# Patient Record
Sex: FEMALE | Race: WHITE | NOT HISPANIC OR LATINO | URBAN - METROPOLITAN AREA
[De-identification: names, ages, dates, MRNs, and addresses within clinical notes are randomized per-mention and may not be internally consistent; named-entity substitution may affect disease eponyms.]

---

## 2023-03-04 LAB
CLUE CELLS: ABNORMAL
NUGENT SCORE: 4
VAGINITIS-BV + YEAST INTERPRETATION: ABNORMAL
YEAST: ABNORMAL

## 2024-02-14 NOTE — PROGRESS NOTES
Naye Recinosargenisstacey is a 53 y.o. G 2 (2002)    No chief complaint on file.       Last Gyn Visit: 12/14/2021 with me  Last pap: 12/10/2019 unremarkable with cotesting  Mammogram: 9/25/2023 unremarkable at [facility]  Colonoscopy: 1/2019 11/2020 postmenopausal without bleeding  Coincided with the start of chemotherapy  Personal history of breast cancer      Assessment and Plan:

## 2024-02-20 ENCOUNTER — APPOINTMENT (OUTPATIENT)
Dept: OBSTETRICS AND GYNECOLOGY | Facility: CLINIC | Age: 54
End: 2024-02-20
Payer: COMMERCIAL

## 2024-02-27 ENCOUNTER — OFFICE VISIT (OUTPATIENT)
Dept: OBSTETRICS AND GYNECOLOGY | Facility: CLINIC | Age: 54
End: 2024-02-27
Payer: COMMERCIAL

## 2024-02-27 VITALS
BODY MASS INDEX: 29.71 KG/M2 | WEIGHT: 174 LBS | SYSTOLIC BLOOD PRESSURE: 118 MMHG | DIASTOLIC BLOOD PRESSURE: 78 MMHG | HEIGHT: 64 IN

## 2024-02-27 DIAGNOSIS — L73.1 INGROWN HAIR: Primary | ICD-10-CM

## 2024-02-27 DIAGNOSIS — Z01.419 WELL WOMAN EXAM WITH ROUTINE GYNECOLOGICAL EXAM: ICD-10-CM

## 2024-02-27 DIAGNOSIS — Z12.4 CERVICAL CANCER SCREENING: ICD-10-CM

## 2024-02-27 PROCEDURE — 88142 CYTOPATH C/V THIN LAYER: CPT

## 2024-02-27 PROCEDURE — 1036F TOBACCO NON-USER: CPT

## 2024-02-27 PROCEDURE — 87624 HPV HI-RISK TYP POOLED RSLT: CPT

## 2024-02-27 PROCEDURE — 99396 PREV VISIT EST AGE 40-64: CPT

## 2024-02-27 RX ORDER — ASPIRIN 325 MG
50000 TABLET, DELAYED RELEASE (ENTERIC COATED) ORAL WEEKLY
COMMUNITY
Start: 2021-11-23

## 2024-02-27 RX ORDER — FERROUS SULFATE, DRIED 160(50) MG
1 TABLET, EXTENDED RELEASE ORAL
COMMUNITY
Start: 2023-02-07

## 2024-02-27 RX ORDER — MUPIROCIN 20 MG/G
OINTMENT TOPICAL
Qty: 22 G | Refills: 2 | Status: SHIPPED | OUTPATIENT
Start: 2024-02-27 | End: 2024-03-08

## 2024-02-27 RX ORDER — LANOLIN ALCOHOL/MO/W.PET/CERES
2000 CREAM (GRAM) TOPICAL
COMMUNITY
Start: 2023-07-20

## 2024-02-27 ASSESSMENT — PAIN SCALES - GENERAL: PAINLEVEL: 0-NO PAIN

## 2024-02-27 NOTE — PROGRESS NOTES
"Assessment/Plan   Diagnoses and all orders for this visit:  Ingrown hair  -     mupirocin (Bactroban) 2 % ointment; Apply topically 3 times a day for 10 days.  Cervical cancer screening  -     THINPREP PAP TEST  Well woman exam with routine gynecological exam   Dicussed lubrication options for vaginal atrophy when avoiding estrogen products     Bette Palomino, APRN-CNM     Subjective   Naye Cisse is a 53 y.o. female who is here for a routine exam.     Concerns today:  Vaginal dryness - pt has a history of breast ca .   Worried about a infected hair follicle on pubic area - says she gets them often and warm compresses have not been effective.     Patient's last menstrual period was 2020.   Pt has not had a period since chemo age 49     Sexual Activity: sexually active, male partners; .  Pain with intercourse? Yes   Loss of desire? Yes   Able to have an orgasm? Yes     History of prior STI: none    Current contraception:  n/a     Last pap: 2019 - nilm hpv neg   History of abnormal Pap smear: no  Family history of uterine or ovarian cancer: no    Last mammogram: up to date   History of abnormal mammogram: yes - pt has a history of breast ca   Family history of breast cancer: no  Menstrual History:  OB History          2    Para   2    Term   2            AB        Living   2         SAB        IAB        Ectopic        Multiple        Live Births   2               Patient's last menstrual period was 2020.       Objective   /78   Ht 1.626 m (5' 4\")   Wt 78.9 kg (174 lb)   LMP 2020   BMI 29.87 kg/m²   Physical Exam  Constitutional:       General: She is not in acute distress.     Appearance: Normal appearance. She is normal weight. She is not ill-appearing.   Genitourinary:      Vulva, rectum and urethral meatus normal.      No lesions in the vagina.      Right Labia: No rash, lesions, skin changes or Bartholin's cyst.     Left Labia: No lesions, skin changes, Bartholin's cyst or " rash.     No labial fusion noted.      No vaginal discharge, erythema or tenderness.      Mild vaginal atrophy present.       Right Adnexa: not tender, not full and no mass present.     Left Adnexa: not tender, not full and no mass present.     No cervical discharge, friability, lesion, polyp or nabothian cyst.      Uterus is not enlarged, fixed, tender or prolapsed.      No uterine mass detected.     Pelvic exam was performed with patient in the lithotomy position.   Breasts:     Breasts are soft.     Right: Normal.      Left: Normal.   HENT:      Head: Normocephalic.      Nose: Nose normal. No congestion.      Mouth/Throat:      Mouth: Mucous membranes are moist.      Pharynx: Oropharynx is clear. No oropharyngeal exudate or posterior oropharyngeal erythema.   Eyes:      General:         Right eye: No discharge.         Left eye: No discharge.      Extraocular Movements: Extraocular movements intact.      Conjunctiva/sclera: Conjunctivae normal.      Pupils: Pupils are equal, round, and reactive to light.   Neck:      Thyroid: No thyroid mass, thyromegaly or thyroid tenderness.   Cardiovascular:      Rate and Rhythm: Normal rate and regular rhythm.      Heart sounds: Normal heart sounds.   Pulmonary:      Effort: Pulmonary effort is normal.      Breath sounds: Normal breath sounds and air entry.   Abdominal:      General: Abdomen is flat. There is no distension.      Palpations: Abdomen is soft.   Musculoskeletal:         General: No swelling, deformity or signs of injury. Normal range of motion.      Cervical back: Full passive range of motion without pain and normal range of motion. No edema, erythema or tenderness.   Neurological:      General: No focal deficit present.      Mental Status: She is alert and oriented to person, place, and time. Mental status is at baseline.      Sensory: No sensory deficit.      Motor: No weakness.      Gait: Gait normal.   Skin:     General: Skin is warm and dry.      Findings:  No bruising, erythema, lesion or rash.      Comments: Small infected follicle on pubic area  - surrounding area non erythematous.     Psychiatric:         Mood and Affect: Mood normal.         Behavior: Behavior normal.         Thought Content: Thought content normal.         Judgment: Judgment normal.         4 = No assist / stand by assistance

## 2024-03-12 ENCOUNTER — HOSPITAL ENCOUNTER (OUTPATIENT)
Dept: RADIOLOGY | Facility: CLINIC | Age: 54
Discharge: HOME | End: 2024-03-12
Payer: COMMERCIAL

## 2024-03-12 ENCOUNTER — OFFICE VISIT (OUTPATIENT)
Dept: ORTHOPEDIC SURGERY | Facility: CLINIC | Age: 54
End: 2024-03-12
Payer: COMMERCIAL

## 2024-03-12 DIAGNOSIS — M79.604 PAIN OF RIGHT LOWER EXTREMITY: ICD-10-CM

## 2024-03-12 DIAGNOSIS — S76.311A HAMSTRING STRAIN, RIGHT, INITIAL ENCOUNTER: ICD-10-CM

## 2024-03-12 PROCEDURE — 1036F TOBACCO NON-USER: CPT | Performed by: FAMILY MEDICINE

## 2024-03-12 PROCEDURE — 73502 X-RAY EXAM HIP UNI 2-3 VIEWS: CPT | Mod: RT

## 2024-03-12 PROCEDURE — 99214 OFFICE O/P EST MOD 30 MIN: CPT | Mod: 25 | Performed by: FAMILY MEDICINE

## 2024-03-12 PROCEDURE — 99204 OFFICE O/P NEW MOD 45 MIN: CPT | Performed by: FAMILY MEDICINE

## 2024-03-12 PROCEDURE — 73502 X-RAY EXAM HIP UNI 2-3 VIEWS: CPT | Mod: RIGHT SIDE | Performed by: FAMILY MEDICINE

## 2024-03-12 RX ORDER — NAPROXEN 500 MG/1
500 TABLET ORAL
Qty: 28 TABLET | Refills: 0 | Status: SHIPPED | OUTPATIENT
Start: 2024-03-12 | End: 2024-03-26

## 2024-03-12 RX ORDER — CYCLOBENZAPRINE HCL 10 MG
10 TABLET ORAL NIGHTLY PRN
Qty: 14 TABLET | Refills: 0 | Status: SHIPPED | OUTPATIENT
Start: 2024-03-12 | End: 2024-03-26

## 2024-03-12 NOTE — LETTER
March 12, 2024     Patient: Naye Cisse   YOB: 1970   Date of Visit: 3/12/2024       To Whom It May Concern:    It is my medical opinion that Naye Cisse  may return to work with no lifting, pushing, or pulling more than 5 pounds for 4 weeks .    If you have any questions or concerns, please don't hesitate to call.         Sincerely,        Cole C Budinsky, MD

## 2024-03-12 NOTE — PROGRESS NOTES
Acute Injury New Patient Visit    CC:   Chief Complaint   Patient presents with    Right Leg - Pain     Rt leg pain  [employer]WC  Fell over rack 3/11/24         HPI: Naye is a 53 y.o.female who presents today with new complaints of BWC injury, pain discomfort to the posterior aspect of the right leg and buttock.  She states that she had fallen forward over a rack.  She currently works at the [employer].  She was moving a rack of clothing when it had fallen forward she fell forward as well and in the process injured the back of her right leg and buttock.  She presents here today for further evaluation.  She denies any numbness tingling or burning states significant pain discomfort and has not had any issue problems with the hamstring or leg in the past.        Review of Systems   GENERAL: Negative for malaise, significant weight loss, fever  MUSCULOSKELETAL: See HPI  NEURO: Negative for numbness / tingling     Past Medical History  Past Medical History:   Diagnosis Date    Encounter for screening mammogram for malignant neoplasm of breast 07/10/2013    Visit for screening mammogram    Epigastric pain 09/03/2013    Dyspepsia    Hirsutism 08/05/2013    Hirsutism    Other conditions influencing health status 08/26/2013    Blepharitis In The Left Eye    Personal history of other diseases of the respiratory system 08/05/2013    History of allergic rhinitis    Personal history of other specified conditions 06/23/2020    History of abnormal mammogram    Plantar fascial fibromatosis 08/05/2013    Plantar fasciitis    Thyrotoxicosis, unspecified without thyrotoxic crisis or storm 08/05/2013    Hyperthyroidism       Medication review  Medication Documentation Review Audit       Reviewed by Suzie Ordoñez MA (Medical Assistant) on 02/27/24 at 1357      Medication Order Taking? Sig Documenting Provider Last Dose Status   calcium carbonate-vitamin D3 500 mg-5 mcg (200 unit) tablet [id number] Yes Take 1 tablet by mouth once daily.  Historical Provider, MD  Active   cholecalciferol (Vitamin D-3) 50,000 unit capsule [id number] Yes Take 1 capsule (50,000 Units) by mouth once a week. Historical Provider, MD  Active   cyanocobalamin (Vitamin B-12) 1,000 mcg tablet [id number] Yes Take 2 tablets (2,000 mcg) by mouth once daily. Historical Provider, MD  Active                    Allergies  Allergies   Allergen Reactions    Penicillins Hives, Itching, Nausea Only, Shortness of breath, Swelling and Unknown    Hydrocodone-Acetaminophen Hallucinations    Minocycline Diarrhea, Dizziness, Nausea Only, Hives and Unknown       Social History  Social History     Socioeconomic History    Marital status:      Spouse name: Not on file    Number of children: Not on file    Years of education: Not on file    Highest education level: Not on file   Occupational History    Not on file   Tobacco Use    Smoking status: Never    Smokeless tobacco: Never   Vaping Use    Vaping Use: Never used   Substance and Sexual Activity    Alcohol use: Not Currently    Drug use: Never    Sexual activity: Yes     Partners: Male   Other Topics Concern    Not on file   Social History Narrative    Not on file     Social Determinants of Health     Financial Resource Strain: Not on file   Food Insecurity: Not on file   Transportation Needs: Not on file   Physical Activity: Not on file   Stress: Not on file   Social Connections: Not on file   Intimate Partner Violence: Not on file   Housing Stability: Not on file       Surgical History  Past Surgical History:   Procedure Laterality Date    BREAST BIOPSY  3 years ago    BREAST LUMPECTOMY  3 years ago    BREAST SURGERY  2013    Breast Surgery Reduction Procedure Bilateral     SECTION, LOW TRANSVERSE  09- 10-    OTHER SURGICAL HISTORY  2013    Contraceptives    SPINE SURGERY  2013    Spine Repair    UMBILICAL HERNIA REPAIR  2013    Umbilical Hernia Repair       Physical Exam:  GENERAL:  Patient  is awake, alert, and oriented to person place and time.  Patient appears well nourished and well kept.  Affect Calm, Not Acutely Distressed.  HEENT:  Normocephalic, Atraumatic, EOMI  CARDIOVASCULAR:  Hemodynamically stable.  RESPIRATORY:  Normal respirations with unlabored breathing.  NEURO: Gross sensation intact to the lower extremities bilaterally.  Extremity: Right lower extremity exam demonstrates skin which is warm pink well-perfused she has global soft tissue tenderness around the piriformis bursa and at the insertion of the hamstring.  There is no obvious palpable defect or deformity.  Patient is extremely uncomfortable with light and firm palpation over the affected region.  The hamstring is quite sore.  No distal hamstring pain medially or laterally.  Mid substance of the hamstring is unaffected.  The quad tendon and quad are nontender.  Tight hamstrings noted with limited ability to flex and extend at the knee.  Normal internal/external rotation about the hip.      Diagnostics: X-rays today were negative for avulsion injury        Procedure: None  Procedures    Assessment:   Problem List Items Addressed This Visit    None  Visit Diagnoses       Pain of right lower extremity        Relevant Orders    XR hip right with pelvis when performed 2 or 3 views    Hamstring strain, right, initial encounter        Relevant Medications    naproxen (Naprosyn) 500 mg tablet    cyclobenzaprine (Flexeril) 10 mg tablet    Other Relevant Orders    Referral to Physical Therapy             Plan: At this time we discussed some conservative approach management to this hamstring strain injury.  Will offer her anti-inflammatory and muscle relaxer.  She denies need for steroid.  Also submit C9 prior authorization for physical therapy prescription.  For now she will hold off on crutches or a walker.  She may ambulate and tolerate weightbearing as able.  No need for repeat x-rays next visit we will see her back in 3 to 4 weeks for  repeat evaluation.  He will be provided a light duty work note no lifting climbing pushing pulling more than 5 pounds until seen in follow-up.  Orders Placed This Encounter    XR hip right with pelvis when performed 2 or 3 views    Referral to Physical Therapy    naproxen (Naprosyn) 500 mg tablet    cyclobenzaprine (Flexeril) 10 mg tablet      At the conclusion of the visit there were no further questions by the patient/family regarding their plan of care.  Patient was instructed to call or return with any issues, questions, or concerns regarding their injury and/or treatment plan described above.     03/12/24 at 1:11 PM - Cole C Budinsky, MD    Office: [phone]    This note was prepared using voice recognition software.  The details of this note are correct and have been reviewed, and corrected to the best of my ability.  Some grammatical errors may persist related to the Dragon software.

## 2024-03-14 LAB
CYTOLOGY CMNT CVX/VAG CYTO-IMP: NORMAL
HPV HR 12 DNA GENITAL QL NAA+PROBE: NEGATIVE
HPV HR GENOTYPES PNL CVX NAA+PROBE: NEGATIVE
HPV16 DNA SPEC QL NAA+PROBE: NEGATIVE
HPV18 DNA SPEC QL NAA+PROBE: NEGATIVE
LAB AP HPV GENOTYPE QUESTION: YES
LAB AP HPV HR: NORMAL
LABORATORY COMMENT REPORT: NORMAL
LABORATORY COMMENT REPORT: NORMAL
PATH REPORT.TOTAL CANCER: NORMAL

## 2024-03-26 ENCOUNTER — EVALUATION (OUTPATIENT)
Dept: PHYSICAL THERAPY | Facility: CLINIC | Age: 54
End: 2024-03-26
Payer: COMMERCIAL

## 2024-03-26 DIAGNOSIS — S76.311A HAMSTRING STRAIN, RIGHT, INITIAL ENCOUNTER: ICD-10-CM

## 2024-03-26 PROCEDURE — 97161 PT EVAL LOW COMPLEX 20 MIN: CPT | Mod: GP

## 2024-03-26 PROCEDURE — 97110 THERAPEUTIC EXERCISES: CPT | Mod: GP

## 2024-03-26 NOTE — PROGRESS NOTES
"Patient Name: Naye Cisse  MRN: [id number]  Today's Date: 3/26/2024  Visit #1  Time Calculation  Start Time: 708  Stop Time: 747  Time Calculation (min): 39 min    Subjective:  Chief Complaint: Naye is a 53 y.o. F who presents to therapy c/o R buttock and posterior thigh pain. Symptoms began 3/11 after falling forward while moving a cart at work. Notes that she felt a \"pop\" and was unable to walk. She was able to ice and notes that it improved slightly that night. . Today she still feels her pain - especially when walking and bending forward. She notes that her pain is most consistent in her R hamstring but occasionally radiates to her R buttock. Denies any N/T, changes in gait, unexplained weight loss, bowel/bladder changes, saddle.anesthesia.   Pain intensity at rest: 0/10  Pain intensity at worst: 7/10  Alleviating factors: Rest, pain medication, ice  Aggravating factors: Bending, stairs, walking  Occupation: [employer] - [employer]  Imaging: R hip films \"unremarkable\"  Therapy Goals: Decrease pain    Objective:  Lumbar ROM  Flexion: 75% loss 2/2 hamstring pain - improved w/ bent knee  Extension: No loss  Side bend (L/R): No loss/25% loss 2/2 hamstring pain  Rotation (L/R): No loss/No loss    Flexion: No change  Extension: No change  Side bend (L/R): No change, No change    Hip ROM (L/R)  Flexion: 125°/125°: Knee ext: 90/60 HS pain  Abduction: 45°/45°  Extension: 10°/10°  External Rotation: 45°/45°  Internal rotation: 45°/45°    Specific Lower Extremity MMT (L/R)  Iliopsoas: 4/5, 4/5  Gluteus Antony (prone): 4/5, 4-/5 (painful)  Hip External Rotation: 4+/5, 4+/5  Gluteus Medius: 4/5, 4/5  Hamstrin+/5, 3+/5 (painful)    Special Tests  Scour: Negative  FADDIR: Negative   Hamstring Length: Unable to test on R 2/2 pain    Palpation: TTP R: Ischial tuberosity, proximal HS (8cm from insertion)      Treatment:  PT Evaluation Time Entry  PT Evaluation (Low) Time Entry: 15  PT Therapeutic Procedures Time Entry  Therapeutic " Exercise Time Entry: 24    Therapeutic Exercise: HEP provided: Completed in session  *Supine heel slides  *Supine HS ISO  *Supine clamshell  *Standing flexion    Education: Recovery of HS strain, expectations, HS anatomy, avoiding pushing through pain, gentle ROM work, purpose of isometrics.     Assessment:   Naye presents to therapy c/o R proximal HS pain after falling over a cart at work. Symptoms appear consistent w/ possible HS strain. She is likely to benefit from skilled interventions to address their impairments, and treatment that includes: manual techniques to decrease pain and improve joint/soft-tissue mobility, as well as exercises to increase strength, endurance, and neuromotor control.    Standardized testing and measures administered today reveal that the patient has multiple impairments in body structures and functions, activity limitations, and participation restrictions.  The patient has 0 personal factors and comorbidities that may serve as barriers affecting the plan of care. The patient's clinical presentation includes stable & uncomplicated characteristics as noted during today's evaluation, & these findings indicate that this patient is of low complexity. Skilled PT services are warranted in order to realize measurable change in the above outcome measures and achieve improvements in the patient's functional status and individual goals.    Plan:   Planned interventions include: dry needling, education/instruction, manual therapy, neuromuscular re-education, self care/home management, therapeutic activities and therapeutic exercises.   Potential to achieve rehab goals: good  Goals:   Demonstrate independence with home exercise program  Tolerate increased exercise without adverse reaction  Demonstrate improved posture & proper body mechanics throughout session  Increase R hip ROM to pain free + WNL  Increase R HS strength to pain free + WNL  Report decrease in pain by > 2 points to meet the  MCID  Increase score of LEFS by > 9 points to meet the MCID  Perform ADLs without an increase symptoms  Ascend / descend stairs without an increase in symptoms  Ambulate >2 miles without an increase in symptoms  Pt. will be able to sleep through the night without an increase in symptoms  Achieve pt. specific goals including: Return to daily exercise routine - uninhibited by pain

## 2024-03-27 ENCOUNTER — APPOINTMENT (OUTPATIENT)
Dept: PHYSICAL THERAPY | Facility: CLINIC | Age: 54
End: 2024-03-27
Payer: COMMERCIAL

## 2024-03-29 ENCOUNTER — TREATMENT (OUTPATIENT)
Dept: PHYSICAL THERAPY | Facility: CLINIC | Age: 54
End: 2024-03-29
Payer: COMMERCIAL

## 2024-03-29 DIAGNOSIS — S76.311A HAMSTRING STRAIN, RIGHT, INITIAL ENCOUNTER: Primary | ICD-10-CM

## 2024-03-29 PROBLEM — S76.311D HAMSTRING STRAIN, RIGHT, SUBSEQUENT ENCOUNTER: Status: ACTIVE | Noted: 2024-03-29

## 2024-03-29 PROCEDURE — 97140 MANUAL THERAPY 1/> REGIONS: CPT | Mod: GP

## 2024-03-29 PROCEDURE — 97110 THERAPEUTIC EXERCISES: CPT | Mod: GP

## 2024-03-29 NOTE — PROGRESS NOTES
Physical Therapy Treatment      Patient Name: Naye Cisse  MRN: [id number]  Today's Date: 3/29/2024  Visit #2       Insurance:  Visit Limit: 12 authorized    Assessment:  Naye presents to therapy w/ improvements in her familiar symptoms. She was able to tolerate progression of HS loading today. Manual well tolerated.    Patient's response to session: Decreased pain and Increased knowledge and understanding    Patient is likely to benefit from skilled interventions to address their impairments, and treatment that includes: manual techniques to decrease pain and improve joint/soft-tissue mobility, as well as exercises to increase strength, endurance, and neuromotor control.     Plan:  Continue per POC.    Current Problem:   1. Hamstring strain, right, initial encounter  Follow Up In Physical Therapy          Subjective   Naye notes that she was able to complete her exercises w/ little to no discomfort. Expresses her frustration w/ slow progression.     Objective       Treatments:       Therapeutic Exercise (37620):  HEP progressed: Handout provided: Completed in full  *Supine HS ISO  *Standing hip ext w/ red TB: 3/3s  *Standing knee flex w/ red TB: 3/3s    Education: Appropriate exercise progressions, avoiding pushing through pain, gentle stretching, answered all questions in full    Manual Therapy (16589):  STM: HS, gluteals, proximal gastroc  Gentle HS pin and stretch  HS stretch contract-relax      Education and discussion on HEP and treatment regarding the benefits related to current condition, POC, pathophysiology, and precautions    *added to HEP

## 2024-04-02 ENCOUNTER — APPOINTMENT (OUTPATIENT)
Dept: ORTHOPEDIC SURGERY | Facility: CLINIC | Age: 54
End: 2024-04-02
Payer: COMMERCIAL

## 2024-04-04 ENCOUNTER — TREATMENT (OUTPATIENT)
Dept: PHYSICAL THERAPY | Facility: CLINIC | Age: 54
End: 2024-04-04
Payer: COMMERCIAL

## 2024-04-04 DIAGNOSIS — S76.311A HAMSTRING STRAIN, RIGHT, INITIAL ENCOUNTER: ICD-10-CM

## 2024-04-04 PROCEDURE — 97140 MANUAL THERAPY 1/> REGIONS: CPT | Mod: GP

## 2024-04-04 PROCEDURE — 97110 THERAPEUTIC EXERCISES: CPT | Mod: GP

## 2024-04-04 NOTE — PROGRESS NOTES
Physical Therapy Treatment      Patient Name: Naye Cisse  MRN: [id number]  Today's Date: 4/4/2024  Visit #3  Time Calculation  Start Time: 1800  Stop Time: 1838  Time Calculation (min): 38 min    Insurance:  Visit Limit: 12 authorized     Assessment:  Naye presents to therapy w/ improvements in her familiar symptoms since last session. She was able to tolerate progression of loading exercises. Will continue to progress as tolerated.     Patient's response to session: Decreased pain and Improved gait    Patient is likely to benefit from skilled interventions to address their impairments, and treatment that includes: manual techniques to decrease pain and improve joint/soft-tissue mobility, as well as exercises to increase strength, endurance, and neuromotor control.     Plan:  Continue per POC.    Current Problem:   1. Hamstring strain, right, initial encounter  Follow Up In Physical Therapy          Subjective   Naye notes that she has been able to walk longer distances and ascend/descend stairs w/ less pain.       Objective       Treatments:  PT Therapeutic Procedures Time Entry  Therapeutic Exercise Time Entry: 23  Therapeutic Massage Time Entry: 15    Therapeutic Exercise (13440):  HEP review  *Supine bridge w/ HS bias  *Standing hip ext w/ blue TB: 3/3s  *Standing knee flex w/ blue TB: 3/3s    Education: Progression of exercises, plyometric work, adjustments to form, pain management, gentle stretching, answered all questions in full    Manual Therapy (28792):  Contract relax: HS stretch  STM: HS, gluteals           Education and discussion on HEP and treatment regarding the benefits related to current condition, POC, pathophysiology, and precautions    *added to HEP

## 2024-04-09 ENCOUNTER — TREATMENT (OUTPATIENT)
Dept: PHYSICAL THERAPY | Facility: CLINIC | Age: 54
End: 2024-04-09
Payer: COMMERCIAL

## 2024-04-09 DIAGNOSIS — S76.311A HAMSTRING STRAIN, RIGHT, INITIAL ENCOUNTER: ICD-10-CM

## 2024-04-09 PROCEDURE — 97110 THERAPEUTIC EXERCISES: CPT | Mod: GP,CQ

## 2024-04-09 NOTE — PROGRESS NOTES
"Patient Name: Naye Cisse  MRN: [id number]  Today's Date: 4/9/2024  Time Calculation  Start Time: 0948  Stop Time: 1030  Time Calculation (min): 42 min  Visit #4    Subjective:  Pt reports she had increased muscle soreness yesterday after completing her HEP. States she has been able to ascend/descend stairs with no pain.    Objective:  Able to independently recall HEP.    Assessment:   Pt tolerated treatment progressions well this date, however prone hip extension caused discomfort. Verbal and tactile cues needed during activities to decrease valgus of R knee. Pt continues to be motivated to return to prior level of function. Pt will benefit from further LE strengthening, stability and mobility activities.    Plan:   Progress as tolerated per the plan of care.     Treatment :   THERAPEUTIC EXERCISE 92856 42 mins/ 3 units  NuStep x5 minutes  HS/GS stretch 2x30  Step ups on 6\" fwd   Blue TB side stepping x2 laps  Cable column walk outs 12.5# x8 laps  SL clamshells x15  Prone extensions   Standing hip flexion 3# x20 each  Bridge with ER BTB x15  SLR 3# x20 each  Tiltboard balance, ant/post, med/lat weight shift 1 minute each              Current Problem:  1. Hamstring strain, right, initial encounter  Follow Up In Physical Therapy              Pain:  5/10   Location: R hamstring     Treatment performed by Mansi AN, under the supervision of Drea Arceo PTA.        "

## 2024-04-12 ENCOUNTER — TREATMENT (OUTPATIENT)
Dept: PHYSICAL THERAPY | Facility: CLINIC | Age: 54
End: 2024-04-12
Payer: COMMERCIAL

## 2024-04-12 DIAGNOSIS — S76.311A HAMSTRING STRAIN, RIGHT, INITIAL ENCOUNTER: ICD-10-CM

## 2024-04-12 PROCEDURE — 97140 MANUAL THERAPY 1/> REGIONS: CPT | Mod: GP,CQ

## 2024-04-12 PROCEDURE — 97110 THERAPEUTIC EXERCISES: CPT | Mod: GP,CQ

## 2024-04-12 NOTE — PROGRESS NOTES
Patient Name: Naye iCsse  MRN: [id number]  Today's Date: 4/12/2024  Time Calculation  Start Time: 1220  Stop Time: 1304  Time Calculation (min): 44 min    Subjective:  Pt reports she is having increased pain in her hamstring. States she did not complete her stretches today which may be contributing to it.  Unable to  something off floor without increased R hamstring pain.    Objective:  + R Hamstring Spasm. Instructed in golfers lift to allow for decreased symptom aggravation with picking up object from floor.     Assessment:   Educated pt in importance of decreasing intensity to end range stretching to allow for proper healing.    Plan:   Cont with POC    Treatment :     NuStep x5 minutes  HS/GS/Hip flexor stretch 2x30  Lunge on step 05x10  Supine nerve glide x15  Bridge with ER GTB x15  Hooklying hip flexion 2# x15  SL hip abduction x15    Manual:  STM Hamstring/Passive stretching           Current Problem:  1. Hamstring strain, right, initial encounter  Follow Up In Physical Therapy         Treatment performed by Mansi AN, under the supervision of Drea Arceo PTA.

## 2024-04-16 ENCOUNTER — TREATMENT (OUTPATIENT)
Dept: PHYSICAL THERAPY | Facility: CLINIC | Age: 54
End: 2024-04-16
Payer: COMMERCIAL

## 2024-04-16 DIAGNOSIS — S76.311A HAMSTRING STRAIN, RIGHT, INITIAL ENCOUNTER: Primary | ICD-10-CM

## 2024-04-16 PROCEDURE — 97140 MANUAL THERAPY 1/> REGIONS: CPT | Mod: GP

## 2024-04-16 PROCEDURE — 97110 THERAPEUTIC EXERCISES: CPT | Mod: GP

## 2024-04-16 NOTE — PROGRESS NOTES
Physical Therapy Treatment      Patient Name: Naye Cisse  MRN: [id number]  Today's Date: 2024  Visit #6  Time Calculation  Start Time: 910  Stop Time: 950  Time Calculation (min): 40 min    Insurance:  Visit Limit: 12 authorized    Assessment:  Naye presents to therapy w/ improvement in her familiar symptoms since last session. Assessment reveals increased R LE strength w/ less symptom irritability. Decreased TTP along R proximal HS (4cm). Progressed HS loading today - which was well tolerated. Mild verbal cueing to prevent knee valgus and improve eccentric control.    Patient's response to session: Decreased pain and Increased knowledge and understanding    Patient is likely to benefit from skilled interventions to address their impairments, and treatment that includes: manual techniques to decrease pain and improve joint/soft-tissue mobility, as well as exercises to increase strength, endurance, and neuromotor control.     Plan:  Continue per POC.    Current Problem:   1. Hamstring strain, right, initial encounter  Follow Up In Physical Therapy          Subjective   Naye notes that she has been able been able to progress her exercise routine w/ little to no discomfort. Notes that her symptoms are less frequent.    Pain = 3/10 today - improving w/ activity    Objective   Specific Lower Extremity MMT (L/R)  Iliopsoas: 4/5, 4/5  Gluteus Antony (prone): 4/5, 4/5 (4/10 familiar pain)  Hip External Rotation: 4+/5, 4+/5  Gluteus Medius: 4/5, 4/5  Hamstrin+/5, 4/5 (4/10 familiar pain)    Palpation: TTP R: proximal HS (4cm from insertion)     Treatments:  PT Therapeutic Procedures Time Entry  Manual Therapy Time Entry: 15  Therapeutic Exercise Time Entry: 30    Therapeutic Exercise (71457):  HEP review  *Supine bridge w/ HS bias  *Standing hip ext w/ blue TB: 3/3s  *Standing knee flex w/ blue TB: 3/3s  *Squat w/ and w/o green TB    Stationary bike: 5' at lvl5 (drop to level 3 d/t symptom irritation)  HS curl:  3 x 12 reps: 30lbs / 15 lbs / 15 lbs  Leg press: 3 x 12 reps: 70 lbs / 50lbs / 50 lbs    Assessment    Education/Discussion: Reviewed safe progressions for home/gym exercises.     Manual Therapy (50602):  Contract relax: HS stretch  STM: HS, gluteals     Education and discussion on HEP and treatment regarding the benefits related to current condition, POC, pathophysiology, and precautions    *added to HEP

## 2024-04-19 ENCOUNTER — APPOINTMENT (OUTPATIENT)
Dept: PHYSICAL THERAPY | Facility: CLINIC | Age: 54
End: 2024-04-19
Payer: COMMERCIAL

## 2024-04-23 ENCOUNTER — TREATMENT (OUTPATIENT)
Dept: PHYSICAL THERAPY | Facility: CLINIC | Age: 54
End: 2024-04-23
Payer: COMMERCIAL

## 2024-04-23 DIAGNOSIS — S76.311A HAMSTRING STRAIN, RIGHT, INITIAL ENCOUNTER: ICD-10-CM

## 2024-04-23 PROCEDURE — 97140 MANUAL THERAPY 1/> REGIONS: CPT | Mod: GP,CQ

## 2024-04-23 PROCEDURE — 97110 THERAPEUTIC EXERCISES: CPT | Mod: GP,CQ

## 2024-04-23 NOTE — PROGRESS NOTES
Patient Name: Naye Cisse  MRN: [id number]  Today's Date: 4/23/2024  Time Calculation  Start Time: 1310  Stop Time: 1340  Time Calculation (min): 30 min    Subjective:  States that overall pain in R hamstring has improved . Symptoms reproduced with walking duration only now. Able to squat without increased pain . States that she feels that she can be done with PT possibly. Follow up with MD  5/1/24.    Objective:  2 /10 mile on track with slight antalgia .    Assessment:   Improved tissue quality noted .    Plan:   Cont with POC     Treatment :   THERAPEUTIC EXERCISE 08395   Gait 2/10 mile on track   Hamstring stretch with Rotation   Hamstring Curl 3x12 reps 30 lbs /15/15   Leg Press 3 x12 70 lbs /50 lbs / 50 lbs   MANUAL TECHNIQUES  79076   IASTM R hamstring   Muscle bending Hamstring   TP Hamstring     Current Problem:  1. Hamstring strain, right, initial encounter  Follow Up In Physical Therapy              Pain:  Denies any pain currently.

## 2024-04-25 ENCOUNTER — TREATMENT (OUTPATIENT)
Dept: PHYSICAL THERAPY | Facility: CLINIC | Age: 54
End: 2024-04-25
Payer: COMMERCIAL

## 2024-04-25 DIAGNOSIS — S76.311A HAMSTRING STRAIN, RIGHT, INITIAL ENCOUNTER: ICD-10-CM

## 2024-04-25 PROCEDURE — 97140 MANUAL THERAPY 1/> REGIONS: CPT | Mod: GP

## 2024-04-25 PROCEDURE — 97110 THERAPEUTIC EXERCISES: CPT | Mod: GP

## 2024-04-25 NOTE — PROGRESS NOTES
Physical Therapy Treatment      Patient Name: Naye Cisse  MRN: [id number]  Today's Date: 4/25/2024  Visit #7  Time Calculation  Start Time: 1630  Stop Time: 1700  Time Calculation (min): 30 min    Insurance:  Visit Limit: 12 authorized     Assessment:  Naye presents to therapy w/ improvements in her familiar pain. She is able to tolerate all therapeutic exercises w/ little to no pain. However - mild pain still present w/ HS loading and palpation. She has a follow up scheduled w/ Dr. Budinsky. I anticipate that she will be able to transition to an independent management program.     Patient's response to session: Decreased pain    Patient is likely to benefit from skilled interventions to address their impairments, and treatment that includes: manual techniques to decrease pain and improve joint/soft-tissue mobility, as well as exercises to increase strength, endurance, and neuromotor control.     Plan:  Continue per POC.    Current Problem:   1. Hamstring strain, right, initial encounter  Follow Up In Physical Therapy          Subjective   Naye notes that her symptoms only seem to reproduced during palpation of her proximal HS. She has been able to transition to all of her usual daily activities/exercises - uninhibited by pain. Needs to leave early to  daughter from school.     Objective       Treatments:  PT Therapeutic Procedures Time Entry  Manual Therapy Time Entry: 10  Therapeutic Exercise Time Entry: 20    Therapeutic Exercise (71075):  HEP review  Stationary bike: 6' @ lvl 5  DL HS curl: 15lbs  SL HS curl: 5lbs (initial pain that improved w/ repetitions)  DL press: 70lbs  SL press: 30lbs    Manual Therapy (92799):  STM: R Proximal HS, gluteals  HS stretch: Contract-relax        Education and discussion on HEP and treatment regarding the benefits related to current condition, POC, pathophysiology, and precautions    *added to HEP

## 2024-04-30 ENCOUNTER — APPOINTMENT (OUTPATIENT)
Dept: PHYSICAL THERAPY | Facility: CLINIC | Age: 54
End: 2024-04-30
Payer: COMMERCIAL

## 2024-05-01 ENCOUNTER — OFFICE VISIT (OUTPATIENT)
Dept: ORTHOPEDIC SURGERY | Facility: CLINIC | Age: 54
End: 2024-05-01
Payer: COMMERCIAL

## 2024-05-01 DIAGNOSIS — S76.311A HAMSTRING STRAIN, RIGHT, INITIAL ENCOUNTER: Primary | ICD-10-CM

## 2024-05-01 PROCEDURE — 99213 OFFICE O/P EST LOW 20 MIN: CPT | Performed by: FAMILY MEDICINE

## 2024-05-01 NOTE — LETTER
May 1, 2024     Patient: Naye Cisse   YOB: 1970   Date of Visit: 5/1/2024       To Whom It May Concern:    It is my medical opinion that Naye Cisse may return to full duty immediately with no restrictions 5/2/24.    If you have any questions or concerns, please don't hesitate to call.         Sincerely,        Cole C Budinsky, MD

## 2024-05-01 NOTE — PROGRESS NOTES
Established Patient Follow-Up Visit    CC:   Chief Complaint   Patient presents with    Right Thigh - Follow-up, Worker's Compensation     Hamstring strain  Re evaluate today DOI: 3/11/24       HPI:  Naye is a 53 y.o. female returns here today for follow-up visit regarding: Return today [facility] injury right hamstring strain.  She states she has been compliant with physical therapy.  She feels nearly 100%.  Intermittently having some mild discomfort deep hide up in the back of the hamstring she has some tightness but is ready to get back to work fully without any restriction.  Patient states she is unable to run or jump and do other activities at home and with therapy without issue          REVIEW OF SYSTEMS:  GENERAL: Negative for malaise, significant weight loss, fever  MUSCULOSKELETAL: See HPI  NEURO: Negative for numbness / tingling       PHYSICAL EXAM:  -Neuro: Gross sensation intact to the lower extremities bilaterally.  -Extremity: Patient with tight hamstrings noted some mild discomfort at the insertion of the hamstring just underneath the ischial tuberosity.  She has a negative logroll full ability to tolerate weightbearing.  No pain at the back of the knee or calf.  Normal gait noted    IMAGING: No new imaging today      PROCEDURE: None  Procedures     ASSESSMENT:   Follow-up visit for:  Problem List Items Addressed This Visit    None  Visit Diagnoses       Hamstring strain, right, initial encounter    -  Primary             PLAN: At this time we will offer the patient as needed follow-up.  She will continue on her last few sessions of physical therapy.  She will be given a full unrestricted return to work note.  Should the persistent worsening issues or concerns she may call or return and if necessary consider submitting prior authorization for further advanced imaging.  For now the patient continue with over-the-counter anti-inflammatories topical muscle rubs creams ice heat etc. as previous and to complete  her PT.  No orders of the defined types were placed in this encounter.          At the conclusion of the visit there were no further questions by the patient/family regarding their plan of care.  Patient was instructed to call or return with any issues, questions, or concerns regarding their injury and/or treatment plan described above.     05/01/24 at 10:49 AM - Cole C Budinsky, MD    Office: [phone]    This note was prepared using voice recognition software.  The details of this note are correct and have been reviewed, and corrected to the best of my ability.  Some grammatical errors may persist related to the Dragon software.

## 2024-05-03 ENCOUNTER — TREATMENT (OUTPATIENT)
Dept: PHYSICAL THERAPY | Facility: CLINIC | Age: 54
End: 2024-05-03
Payer: COMMERCIAL

## 2024-05-03 DIAGNOSIS — S76.311A HAMSTRING STRAIN, RIGHT, INITIAL ENCOUNTER: ICD-10-CM

## 2024-05-03 PROCEDURE — 97110 THERAPEUTIC EXERCISES: CPT | Mod: GP

## 2024-05-03 NOTE — PROGRESS NOTES
Physical Therapy    Discharge Summary    Name: Naye Cisse  MRN: [id number]  : 1970  Date: 24    Discharge Summary: PT    Discharge Information: Date of discharge 5/3/2024, Date of last visit 5/3/2024, Date of evaluation 3/26/2024, and Number of attended visits 8    Therapy Summary: Naye presents to therapy w/ significant improvements in her familiar pain since initial evaluation. She has progressed back to her home/recreational/work activities - uninhibited by pain. Strength/ROM/Muscle length all appear symmetrical and do not reproduce familiar symptoms. She will manage her care independently and feels confident moving forward. Instructed her to call or follow up if she has any questions or concerns.     Objective:    Reassessment:   Lumbar ROM  Flexion: No loss  Extension: No loss  Side bend (L/R): No loss/No loss  Rotation (L/R): No loss/No loss     Hip ROM (L/R)  Flexion: 125°/125°: Knee ext: 90/90  Abduction: 45°/45°  Extension: 10°/10°  External Rotation: 45°/45°  Internal rotation: 45°/45°     Specific Lower Extremity MMT (L/R)  Iliopsoas: 4/5, 4/5  Gluteus Antony (prone): 4/5, 4/5  Hip External Rotation: 4+/5, 4+/5  Gluteus Medius: 4/5, 4/5  Hamstrin+/5, 4+/5      Special Tests  Scour: Negative  FADDIR: Negative   KIM: Negative     Palpation: TTP R: Ischial tuberosity (mild)    LEFS: 80      Treatment:   Therapeutic Exercise:  Reviewed safe gym activities and progressions  Continued HS loading  Self-STM: HS, gluteals (ball provided)    Answered all questions in full    Rehab Discharge Reason: Achieved all and/or the most significant goals(s)  Goals: Met 5/3/2024  Demonstrate independence with home exercise program  Tolerate increased exercise without adverse reaction  Demonstrate improved posture & proper body mechanics throughout session  Increase R hip ROM to pain free + WNL  Increase R HS strength to pain free + WNL  Report decrease in pain by > 2 points to meet the MCID  Increase  score of LEFS by > 9 points to meet the MCID  Perform ADLs without an increase symptoms  Ascend / descend stairs without an increase in symptoms  Ambulate >2 miles without an increase in symptoms  Pt. will be able to sleep through the night without an increase in symptoms  Achieve pt. specific goals including: Return to daily exercise routine - uninhibited by pain

## 2024-05-07 ENCOUNTER — APPOINTMENT (OUTPATIENT)
Dept: PHYSICAL THERAPY | Facility: CLINIC | Age: 54
End: 2024-05-07
Payer: COMMERCIAL

## 2024-10-07 ENCOUNTER — OFFICE VISIT (OUTPATIENT)
Dept: OBSTETRICS AND GYNECOLOGY | Facility: CLINIC | Age: 54
End: 2024-10-07
Payer: COMMERCIAL

## 2024-10-07 ENCOUNTER — TELEPHONE (OUTPATIENT)
Dept: OBSTETRICS AND GYNECOLOGY | Facility: CLINIC | Age: 54
End: 2024-10-07

## 2024-10-07 VITALS
BODY MASS INDEX: 29.71 KG/M2 | DIASTOLIC BLOOD PRESSURE: 64 MMHG | WEIGHT: 174 LBS | SYSTOLIC BLOOD PRESSURE: 118 MMHG | HEIGHT: 64 IN

## 2024-10-07 DIAGNOSIS — N95.0 POST-MENOPAUSAL BLEEDING: Primary | ICD-10-CM

## 2024-10-07 DIAGNOSIS — Z01.89 NORMAL HYSTEROSCOPY: ICD-10-CM

## 2024-10-07 PROCEDURE — 1036F TOBACCO NON-USER: CPT | Performed by: STUDENT IN AN ORGANIZED HEALTH CARE EDUCATION/TRAINING PROGRAM

## 2024-10-07 PROCEDURE — 99213 OFFICE O/P EST LOW 20 MIN: CPT | Performed by: STUDENT IN AN ORGANIZED HEALTH CARE EDUCATION/TRAINING PROGRAM

## 2024-10-07 PROCEDURE — 3008F BODY MASS INDEX DOCD: CPT | Performed by: STUDENT IN AN ORGANIZED HEALTH CARE EDUCATION/TRAINING PROGRAM

## 2024-10-07 RX ORDER — EXEMESTANE 25 MG/1
25 TABLET ORAL DAILY
COMMUNITY
Start: 2024-04-01

## 2024-10-07 RX ORDER — IBUPROFEN 800 MG/1
800 TABLET ORAL EVERY 8 HOURS PRN
Qty: 30 TABLET | Refills: 0 | Status: SHIPPED | OUTPATIENT
Start: 2024-10-07 | End: 2024-10-17

## 2024-10-07 RX ORDER — MISOPROSTOL 200 UG/1
200 TABLET ORAL ONCE
Qty: 1 TABLET | Refills: 0 | Status: SHIPPED | OUTPATIENT
Start: 2024-10-07 | End: 2024-10-07

## 2024-10-07 ASSESSMENT — PAIN SCALES - GENERAL: PAINLEVEL: 0-NO PAIN

## 2024-10-07 NOTE — PROGRESS NOTES
Subjective   Patient ID: Naye Cisse is a 53 y.o. female who presents for Vaginal Bleeding (Sat. had bleeding and pressure in abd.).    No periods since first cycle of chemotherapy. Previously on anastrozole and tamoxifen, did not work due to side effects. Currently taking Aromasin.    Had breast tenderness and low back pain last week, then Saturday had vaginal bleeding with pain and cramping of pelvis, soaking her underwear and pants. Reports she continues to have pelvic pain and pressure like a period. No bleeding since Saturday.     She desires hysterectomy, BSO as she is concerned for development of cancer.    Objective   Physical Exam  Constitutional:       General: She is not in acute distress.     Appearance: Normal appearance.   HENT:      Head: Normocephalic.   Cardiovascular:      Rate and Rhythm: Normal rate.   Pulmonary:      Effort: Pulmonary effort is normal. No respiratory distress.   Skin:     General: Skin is warm and dry.   Neurological:      Mental Status: She is alert and oriented to person, place, and time.   Psychiatric:         Mood and Affect: Mood normal.         Behavior: Behavior normal.         Thought Content: Thought content normal.         Judgment: Judgment normal.         Assessment/Plan   Problem List Items Addressed This Visit             ICD-10-CM    Post-menopausal bleeding - Primary N95.0     Strongly desires hysterectomy - discussed need for endometrial tissue sampling to rule out malignancy (also reviewed other possible causes)  Offered EMB - concerned about discomfort. Discussed hysteroscopy in office - desires this approach.   Next available 11/4, will schedule  Plan for endometrial sampling, thorough exam at that time at patient request  Pap UTD as of 2/2024- KAI Sloan MD 10/07/24 10:27 AM

## 2024-10-07 NOTE — TELEPHONE ENCOUNTER
Pt has been scheduled for an I/O hysteroscopy 11/4/24 at 1330. Pt is aware to arrive by 1315. Procedure instructions and medications reviewed. Pt has printed instructions. Declines Xanax and Toradol. Rx sent to provider to sign off.

## 2024-10-07 NOTE — ASSESSMENT & PLAN NOTE
Strongly desires hysterectomy - discussed need for endometrial tissue sampling to rule out malignancy (also reviewed other possible causes)  Offered EMB - concerned about discomfort. Discussed hysteroscopy in office - desires this approach.   Next available 11/4, will schedule  Plan for endometrial sampling, thorough exam at that time at patient request  Pap UTD as of 2/2024- NILM

## 2024-11-03 ASSESSMENT — ENCOUNTER SYMPTOMS
FREQUENCY: 0
ANOREXIA: 0
FLANK PAIN: 0
DIARRHEA: 0
CHILLS: 0
CONSTIPATION: 0
ABDOMINAL PAIN: 0
HEADACHES: 0
BACK PAIN: 0
HEMATURIA: 0
VOMITING: 0
DYSURIA: 0
SORE THROAT: 0
NAUSEA: 0
FEVER: 0

## 2024-11-04 ENCOUNTER — APPOINTMENT (OUTPATIENT)
Dept: OBSTETRICS AND GYNECOLOGY | Facility: CLINIC | Age: 54
End: 2024-11-04
Payer: COMMERCIAL

## 2024-11-04 VITALS
HEIGHT: 64 IN | WEIGHT: 178 LBS | SYSTOLIC BLOOD PRESSURE: 126 MMHG | BODY MASS INDEX: 30.39 KG/M2 | DIASTOLIC BLOOD PRESSURE: 60 MMHG

## 2024-11-04 DIAGNOSIS — N95.0 POST-MENOPAUSAL BLEEDING: Primary | ICD-10-CM

## 2024-11-04 PROCEDURE — 58558 HYSTEROSCOPY BIOPSY: CPT | Performed by: STUDENT IN AN ORGANIZED HEALTH CARE EDUCATION/TRAINING PROGRAM

## 2024-11-04 ASSESSMENT — PAIN SCALES - GENERAL: PAINLEVEL_OUTOF10: 0-NO PAIN

## 2024-11-08 NOTE — PROGRESS NOTES
Chief Complaint: .Sejal Mendez is here today for   Chief Complaint   Patient presents with    Physical          Medications: currently is not taking any medications    Refills needed today?  NO    Latex allergy or sensitivity: No known latex allergy     Patient would like communication of their results via:  [employer]    Cell Phone:   Telephone Information:   Mobile [phone]     Okay to leave a message containing results? Yes    Patient's current [facility] status: Active.    Advanced directives: discussed    Care Teams Verified: No Changes    Depression Screen: no    Tobacco history: verified       Health Maintenance       Hepatitis B Vaccine (2 of 3 - 3-dose series)  Overdue since 8/4/1997    Influenza Vaccine (1)  Never done    COVID-19 Vaccine (3 - 2024-25 season)  Overdue since 9/1/2024    Depression Screening (Yearly)  Due soon on 4/26/2025           Following review of the above:  Patient wishes to discuss with clinician: COVID-19, Hep B, and Influenza    Note: Refer to final orders and clinician documentation.             Procedure - In Office Hysteroscopy    Indication: Post menopausal bleeding, desire for hysterectomy    Findings: Normal appearing external female genitalia. Vaginal mucosa atrophic appearing without lesions. Cervix stenotic without lesions. Endocervical canal normal appearing. Uterine cavity with atrophic, pale endometrium, bilateral tubal ostia visualized. Single multi-lobulated anterio-fundal polyp. No abnormal vasculature or evidence of hyperplasia.     Details:  Patient was counseled on risks, benefits and alternatives to the procedure and provided written consent to proceed. She was pre-medicated with cytotec 200mcg, ibuprofen 800mg at home, and declined anxiolytic or toradol. Time out performed per protocol. She was placed in dorsal lithotomy position and a speculum was inserted. Cervix was cleansed with betadine and anterior lip of the cervix infiltrated with 4cc 1% lidocaine with epinephrine. Cervix was grasped with a single tooth tenaculum and a paracervical block with 1% lidocaine with epinephrine, 14cc total, was administered at the cervicovaginal junction at 4:00 and 8:00. Os finder was used to dilate stenotic external os and endometrial biopsy pipelle was passed and sounded to 8cm. 1 pass undertaken with no tissue returned. Next a size 15F giordano dilator was used to dilate the cervix.    The hysteroscope was now introduced and using hydro-dilation with 0.9% NS as distension medium, was advanced to the uterine cavity. Bilateral tubal ostia were visualized. Findings noted as above. Manual morcellator was introduced and utilized to resect polypoid tissue. Majority of tissue resected when patient requested termination of procedure due to discomfort. Morcellator and hysteroscope were now removed and single tooth tenaculum released from cervix. Hemostasis was confirmed and speculum removed.     Dispo: Patient tolerated the procedure moderately well without complication. Final plan pending pathology.    Luciano  CLARISA Sloan MD

## 2024-11-12 LAB
LABORATORY COMMENT REPORT: NORMAL
PATH REPORT.FINAL DX SPEC: NORMAL
PATH REPORT.GROSS SPEC: NORMAL
PATH REPORT.RELEVANT HX SPEC: NORMAL
PATH REPORT.TOTAL CANCER: NORMAL

## 2024-12-05 ENCOUNTER — APPOINTMENT (OUTPATIENT)
Dept: OBSTETRICS AND GYNECOLOGY | Facility: CLINIC | Age: 54
End: 2024-12-05
Payer: COMMERCIAL

## 2024-12-05 DIAGNOSIS — N95.0 POST-MENOPAUSAL BLEEDING: Primary | ICD-10-CM

## 2024-12-05 PROCEDURE — 99213 OFFICE O/P EST LOW 20 MIN: CPT | Performed by: STUDENT IN AN ORGANIZED HEALTH CARE EDUCATION/TRAINING PROGRAM

## 2024-12-05 PROCEDURE — 1036F TOBACCO NON-USER: CPT | Performed by: STUDENT IN AN ORGANIZED HEALTH CARE EDUCATION/TRAINING PROGRAM

## 2024-12-05 ASSESSMENT — ENCOUNTER SYMPTOMS
SORE THROAT: 0
BACK PAIN: 0
DYSURIA: 0
HEADACHES: 0
VOMITING: 0
NAUSEA: 0
FEVER: 0
ANOREXIA: 0
CONSTIPATION: 0
ABDOMINAL PAIN: 0
CHILLS: 0
HEMATURIA: 0
FREQUENCY: 0
FLANK PAIN: 0
DIARRHEA: 0

## 2024-12-05 NOTE — ASSESSMENT & PLAN NOTE
Final pathology reviewed - focal simple papillary proliferation of polyp, otherwise normal appearing  Reviewed basics of hysterectomy including route, organs removed, consideration of BSO, recovery time, residual cancer risks  She is very interested - going to speak with her  and notify clinic of final decision  If she desires, can schedule and plan to return to clinic 3-4 weeks prior to surgery to sign consents and review all history

## 2024-12-05 NOTE — PROGRESS NOTES
Subjective   Patient ID: Naye Cisse is a 54 y.o. female who presents for follow up after hysteroscopy, consideration of hysterectomy.    Doing well since procedure, has only had 2 episodes vaginal spotting, less than prior.     She is very interested in pursuing hysterectomy with BSO to reduce risk of cancer and eliminate chance of this happening again. She has a history of breast cancer, currently on aromatase inhibitor.    PMH: Breast cancer, hyperthyroid  PSH:  x 2, umbilical hernia repair (confirm presence/absence of mesh), breast lumpectomy, spinal surgery      Objective   Physical Exam  Pulmonary:      Effort: Pulmonary effort is normal.   Neurological:      Mental Status: She is alert and oriented to person, place, and time.   Psychiatric:         Mood and Affect: Mood normal.         Behavior: Behavior normal.         Thought Content: Thought content normal.         Judgment: Judgment normal.         Assessment/Plan   Problem List Items Addressed This Visit             ICD-10-CM    Post-menopausal bleeding - Primary N95.0     Final pathology reviewed - focal simple papillary proliferation of polyp, otherwise normal appearing  Reviewed basics of hysterectomy including route, organs removed, consideration of BSO, recovery time, residual cancer risks  She is very interested - going to speak with her  and notify clinic of final decision  If she desires, can schedule and plan to return to clinic 3-4 weeks prior to surgery to sign consents and review all history          Virtual or Telephone Consent    A telephone visit (audio only) between the patient (at the originating site) and the provider (at the distant site) was utilized to provide this telehealth service.   Verbal consent was requested and obtained from Naye Cisse on this date, 24 for a telehealth visit.  She confirms location in the [facility].    Total time spent: 22 mins including 16 mins direct patient care and 6 mins  documentation and care coordination         Luciano Sloan MD 12/05/24 9:55 AM

## 2025-02-28 ENCOUNTER — APPOINTMENT (OUTPATIENT)
Dept: OBSTETRICS AND GYNECOLOGY | Facility: CLINIC | Age: 55
End: 2025-02-28
Payer: COMMERCIAL

## 2025-03-05 ENCOUNTER — APPOINTMENT (OUTPATIENT)
Dept: OBSTETRICS AND GYNECOLOGY | Facility: CLINIC | Age: 55
End: 2025-03-05
Payer: COMMERCIAL

## 2025-03-05 VITALS
WEIGHT: 178.2 LBS | SYSTOLIC BLOOD PRESSURE: 110 MMHG | DIASTOLIC BLOOD PRESSURE: 84 MMHG | HEIGHT: 63 IN | BODY MASS INDEX: 31.57 KG/M2

## 2025-03-05 DIAGNOSIS — Z12.4 CERVICAL CANCER SCREENING: ICD-10-CM

## 2025-03-05 DIAGNOSIS — Z01.419 WELL WOMAN EXAM WITH ROUTINE GYNECOLOGICAL EXAM: Primary | ICD-10-CM

## 2025-03-05 PROCEDURE — 3008F BODY MASS INDEX DOCD: CPT

## 2025-03-05 PROCEDURE — 99396 PREV VISIT EST AGE 40-64: CPT

## 2025-03-05 RX ORDER — CAFFEINE CITRATE 20 MG/ML
SOLUTION ORAL
COMMUNITY

## 2025-03-05 ASSESSMENT — PATIENT HEALTH QUESTIONNAIRE - PHQ9
SUM OF ALL RESPONSES TO PHQ9 QUESTIONS 1 AND 2: 0
1. LITTLE INTEREST OR PLEASURE IN DOING THINGS: NOT AT ALL
2. FEELING DOWN, DEPRESSED OR HOPELESS: NOT AT ALL

## 2025-03-05 NOTE — PROGRESS NOTES
"Subjective   Naye Cisse is a 54 y.o. female who is here for No chief complaint on file..     Concerns today:  Pt previously seen by Dr Sloan to discuss hysterectomy - she wants to reduce risk of cancer and eliminate chance of PMB. She has a history of breast cancer, currently on aromatase inhibitor.  She is still considering, does not have help at home and is considering what recovery would look like for her.      PMH: Breast cancer, hyperthyroid  PSH:  x 2, umbilical hernia repair, breast lumpectomy, spinal surgery    Patient is postmenopausal.   Experiencing menopause symptoms? VMS  The patient is not taking hormone therapy.   Any Contraindications to HT:  Yes - estrogen sensitive cancer     Sexual Activity: not sexually active      History of prior STI: none  Desires STI screening? No      Last pap: 2024 - NILM HPV neg  S/p hysteroscopy for PMB in 2024. Pathology report: \"Markedly fragmented endometrial poylp with tubal metaplasia, and focal simple papillary proliferation.  - Fragments of endocervical mucosa with no significant pathologic changes.\"  History of abnormal Pap smear: no  Family history of uterine or ovarian cancer: no    Last mammogram: 2024 - neg   History of abnormal mammogram: yes  Family history of breast cancer: no  OB History    Para Term  AB Living   2 2 2 0 0 2   SAB IAB Ectopic Multiple Live Births   0 0 0 0 2      Objective   LMP 2020    Physical Exam  Constitutional:       General: She is not in acute distress.     Appearance: Normal appearance. She is normal weight. She is not ill-appearing.   Genitourinary:      Vulva, rectum and urethral meatus normal.      No lesions in the vagina.      Right Labia: No rash, lesions, skin changes or Bartholin's cyst.     Left Labia: No lesions, skin changes, Bartholin's cyst or rash.     No labial fusion noted.      No vaginal discharge, erythema or tenderness.      Mild vaginal atrophy present.       Right " Adnexa: not tender, not full and no mass present.     Left Adnexa: not tender, not full and no mass present.     No cervical discharge, friability, lesion, polyp or nabothian cyst.      Uterus is not enlarged, fixed, tender or prolapsed.      No uterine mass detected.     Pelvic exam was performed with patient in the lithotomy position.   Breasts:     Breasts are soft.     Right: Normal.      Left: Normal.   HENT:      Head: Normocephalic.      Nose: Nose normal. No congestion.      Mouth/Throat:      Mouth: Mucous membranes are moist.      Pharynx: Oropharynx is clear. No oropharyngeal exudate or posterior oropharyngeal erythema.   Eyes:      General:         Right eye: No discharge.         Left eye: No discharge.      Extraocular Movements: Extraocular movements intact.      Conjunctiva/sclera: Conjunctivae normal.      Pupils: Pupils are equal, round, and reactive to light.   Neck:      Thyroid: No thyroid mass, thyromegaly or thyroid tenderness.   Cardiovascular:      Rate and Rhythm: Normal rate and regular rhythm.      Heart sounds: Normal heart sounds.   Pulmonary:      Effort: Pulmonary effort is normal.      Breath sounds: Normal breath sounds and air entry.   Abdominal:      General: Abdomen is flat. There is no distension.      Palpations: Abdomen is soft.   Musculoskeletal:         General: No swelling, deformity or signs of injury. Normal range of motion.      Cervical back: Full passive range of motion without pain and normal range of motion. No edema, erythema or tenderness.   Neurological:      General: No focal deficit present.      Mental Status: She is alert and oriented to person, place, and time. Mental status is at baseline.      Sensory: No sensory deficit.      Motor: No weakness.      Gait: Gait normal.   Skin:     General: Skin is warm and dry.      Findings: No bruising, erythema, lesion or rash.   Psychiatric:         Mood and Affect: Mood normal.         Behavior: Behavior normal.          Thought Content: Thought content normal.         Judgment: Judgment normal.   Vitals reviewed.          Assessment/Plan   Diagnoses and all orders for this visit:  Well woman exam with routine gynecological exam  Cervical cancer screening    No follow-ups on file.  Bette Palomino, RAMSES-DONOVAN

## 2026-03-11 ENCOUNTER — APPOINTMENT (OUTPATIENT)
Dept: OBSTETRICS AND GYNECOLOGY | Facility: CLINIC | Age: 56
End: 2026-03-11
Payer: COMMERCIAL